# Patient Record
Sex: FEMALE | Race: WHITE | URBAN - METROPOLITAN AREA
[De-identification: names, ages, dates, MRNs, and addresses within clinical notes are randomized per-mention and may not be internally consistent; named-entity substitution may affect disease eponyms.]

---

## 2021-06-07 ENCOUNTER — APPOINTMENT (RX ONLY)
Dept: URBAN - METROPOLITAN AREA CLINIC 38 | Facility: CLINIC | Age: 46
Setting detail: DERMATOLOGY
End: 2021-06-07

## 2021-06-07 ENCOUNTER — APPOINTMENT (RX ONLY)
Dept: URBAN - METROPOLITAN AREA CLINIC 37 | Facility: CLINIC | Age: 46
Setting detail: DERMATOLOGY
End: 2021-06-07

## 2021-06-07 DIAGNOSIS — L73.2 HIDRADENITIS SUPPURATIVA: ICD-10-CM | Status: INADEQUATELY CONTROLLED

## 2021-06-07 PROCEDURE — ? ADDITIONAL NOTES

## 2021-06-07 PROCEDURE — ? PRESCRIPTION

## 2021-06-07 PROCEDURE — 99204 OFFICE O/P NEW MOD 45 MIN: CPT

## 2021-06-07 PROCEDURE — ? TREATMENT REGIMEN

## 2021-06-07 PROCEDURE — ? COUNSELING

## 2021-06-07 RX ORDER — CLINDAMYCIN PHOSPHATE 10 MG/ML
THIN LAYER SOLUTION TOPICAL QD
Qty: 1 | Refills: 1 | Status: ERX | COMMUNITY
Start: 2021-06-07

## 2021-06-07 RX ORDER — FLUOCINONIDE 0.5 MG/G
PEA SIZED CREAM TOPICAL QD
Qty: 1 | Refills: 0 | Status: ERX | COMMUNITY
Start: 2021-06-07

## 2021-06-07 RX ADMIN — FLUOCINONIDE PEA SIZED: 0.5 CREAM TOPICAL at 00:00

## 2021-06-07 RX ADMIN — CLINDAMYCIN PHOSPHATE THIN LAYER: 10 SOLUTION TOPICAL at 00:00

## 2021-06-07 ASSESSMENT — LOCATION SIMPLE DESCRIPTION DERM
LOCATION SIMPLE: ABDOMEN
LOCATION SIMPLE: LEFT AXILLARY VAULT
LOCATION SIMPLE: RIGHT AXILLARY VAULT
LOCATION SIMPLE: LEFT AXILLARY VAULT
LOCATION SIMPLE: ABDOMEN
LOCATION SIMPLE: RIGHT AXILLARY VAULT

## 2021-06-07 ASSESSMENT — LOCATION DETAILED DESCRIPTION DERM
LOCATION DETAILED: PERIUMBILICAL SKIN
LOCATION DETAILED: PERIUMBILICAL SKIN
LOCATION DETAILED: LEFT AXILLARY VAULT
LOCATION DETAILED: RIGHT AXILLARY VAULT
LOCATION DETAILED: RIGHT AXILLARY VAULT
LOCATION DETAILED: LEFT AXILLARY VAULT

## 2021-06-07 ASSESSMENT — LOCATION ZONE DERM
LOCATION ZONE: TRUNK
LOCATION ZONE: TRUNK
LOCATION ZONE: AXILLAE
LOCATION ZONE: AXILLAE

## 2021-06-07 NOTE — PROCEDURE: ADDITIONAL NOTES
Patient Management Risk Assessment: Moderate
Render Risk Assessment In Note?: yes
Detail Level: Detailed
Additional Notes: Start topical steroid and antibiotic, will request approval for Humira pending normal bloodwork if patient is satisfied with her insurance coverage for the medication.

## 2021-06-07 NOTE — PROCEDURE: TREATMENT REGIMEN
Plan: Pt doesn’t not want to use oral antibiotics, she is ok with topical treatment and is interested in laser treatment. We discussed the options in depth with the patient today. Pt would like to start Humira but wants to wait on doing labs until she speaks with her insurance about coverage for the Humira. Pt given ICD10 for HS and she will call and let us know when she is ready to start approval process. Pt advised that it may take time to get an approval
Detail Level: Generalized
Initiate Treatment: Clindamycin solution qd \\nFluocinonide cream qd

## 2021-06-07 NOTE — PROCEDURE: ADDITIONAL NOTES
Detail Level: Detailed
Render Risk Assessment In Note?: yes
Patient Management Risk Assessment: Moderate
Additional Notes: Start topical steroid and antibiotic, will request approval for Humira pending normal bloodwork if patient is satisfied with her insurance coverage for the medication.

## 2021-06-07 NOTE — PROCEDURE: TREATMENT REGIMEN
Initiate Treatment: Clindamycin solution qd \\nFluocinonide cream qd
Detail Level: Generalized
Plan: Pt doesn’t not want to use oral antibiotics, she is ok with topical treatment and is interested in laser treatment. We discussed the options in depth with the patient today. Pt would like to start Humira but wants to wait on doing labs until she speaks with her insurance about coverage for the Humira. Pt given ICD10 for HS and she will call and let us know when she is ready to start approval process. Pt advised that it may take time to get an approval

## 2021-06-07 NOTE — HPI: EVALUATION OF SKIN LESION(S)
What Type Of Note Output Would You Prefer (Optional)?: Bullet Format
How Severe Are Your Spot(S)?: mild
Have Your Spot(S) Been Treated In The Past?: has not been treated
Hpi Title: Evaluation of Skin Lesions
Additional History: PT states she has had HS for 20 years. Pt states she was seen by a different dermatologist in 2020 and they did labs for Humira for HS. Pt states she has had excision in the axilla and groin along with laser surgery. Pt states she has relief for a couple of months after the surgery but they always come back

## 2021-06-07 NOTE — HPI: EVALUATION OF SKIN LESION(S)
What Type Of Note Output Would You Prefer (Optional)?: Bullet Format
Hpi Title: Evaluation of Skin Lesions
How Severe Are Your Spot(S)?: mild
Have Your Spot(S) Been Treated In The Past?: has not been treated
Additional History: PT states she has had HS for 20 years. Pt states she was seen by a different dermatologist in 2020 and they did labs for Humira for HS. Pt states she has had excision in the axilla and groin along with laser surgery. Pt states she has relief for a couple of months after the surgery but they always come back